# Patient Record
Sex: FEMALE | Race: BLACK OR AFRICAN AMERICAN | NOT HISPANIC OR LATINO | Employment: OTHER | URBAN - METROPOLITAN AREA
[De-identification: names, ages, dates, MRNs, and addresses within clinical notes are randomized per-mention and may not be internally consistent; named-entity substitution may affect disease eponyms.]

---

## 2018-04-28 ENCOUNTER — HOSPITAL ENCOUNTER (EMERGENCY)
Facility: HOSPITAL | Age: 48
Discharge: HOME | End: 2018-04-28
Payer: COMMERCIAL

## 2018-04-28 VITALS
DIASTOLIC BLOOD PRESSURE: 80 MMHG | WEIGHT: 170.19 LBS | OXYGEN SATURATION: 97 % | RESPIRATION RATE: 18 BRPM | HEART RATE: 87 BPM | TEMPERATURE: 98 F | HEIGHT: 63 IN | SYSTOLIC BLOOD PRESSURE: 140 MMHG | BODY MASS INDEX: 30.16 KG/M2

## 2018-04-28 DIAGNOSIS — N92.1 MENORRHAGIA WITH IRREGULAR CYCLE: ICD-10-CM

## 2018-04-28 DIAGNOSIS — R73.9 HYPERGLYCEMIA: Primary | ICD-10-CM

## 2018-04-28 LAB
ALBUMIN SERPL BCP-MCNC: 4.5 G/DL
ALP SERPL-CCNC: 80 U/L
ALT SERPL W/O P-5'-P-CCNC: 20 U/L
ANION GAP SERPL CALC-SCNC: 16 MMOL/L
AST SERPL-CCNC: 19 U/L
B-HCG UR QL: NEGATIVE
BACTERIA #/AREA URNS HPF: NORMAL /HPF
BASOPHILS # BLD AUTO: 0.02 K/UL
BASOPHILS NFR BLD: 0.2 %
BILIRUB SERPL-MCNC: 0.6 MG/DL
BILIRUB UR QL STRIP: NEGATIVE
BUN SERPL-MCNC: 14 MG/DL
CALCIUM SERPL-MCNC: 10.5 MG/DL
CHLORIDE SERPL-SCNC: 98 MMOL/L
CLARITY UR: CLEAR
CO2 SERPL-SCNC: 21 MMOL/L
COLOR UR: YELLOW
CREAT SERPL-MCNC: 1 MG/DL
DIFFERENTIAL METHOD: ABNORMAL
EOSINOPHIL # BLD AUTO: 0.2 K/UL
EOSINOPHIL NFR BLD: 1.8 %
ERYTHROCYTE [DISTWIDTH] IN BLOOD BY AUTOMATED COUNT: 13.1 %
EST. GFR  (AFRICAN AMERICAN): >60 ML/MIN/1.73 M^2
EST. GFR  (NON AFRICAN AMERICAN): >60 ML/MIN/1.73 M^2
GLUCOSE SERPL-MCNC: 319 MG/DL (ref 70–110)
GLUCOSE SERPL-MCNC: 405 MG/DL
GLUCOSE UR QL STRIP: ABNORMAL
HCG INTACT+B SERPL-ACNC: <1.2 MIU/ML
HCT VFR BLD AUTO: 41 %
HGB BLD-MCNC: 14.2 G/DL
HGB UR QL STRIP: ABNORMAL
KETONES UR QL STRIP: ABNORMAL
LEUKOCYTE ESTERASE UR QL STRIP: NEGATIVE
LYMPHOCYTES # BLD AUTO: 3.3 K/UL
LYMPHOCYTES NFR BLD: 38.6 %
MCH RBC QN AUTO: 31.2 PG
MCHC RBC AUTO-ENTMCNC: 34.6 G/DL
MCV RBC AUTO: 90 FL
MICROSCOPIC COMMENT: NORMAL
MONOCYTES # BLD AUTO: 0.5 K/UL
MONOCYTES NFR BLD: 5.8 %
NEUTROPHILS # BLD AUTO: 4.6 K/UL
NEUTROPHILS NFR BLD: 53.6 %
NITRITE UR QL STRIP: NEGATIVE
PH UR STRIP: 5 [PH] (ref 5–8)
PLATELET # BLD AUTO: 275 K/UL
PMV BLD AUTO: 9.5 FL
POCT GLUCOSE: 319 MG/DL (ref 70–110)
POTASSIUM SERPL-SCNC: 4 MMOL/L
PROT SERPL-MCNC: 8.7 G/DL
PROT UR QL STRIP: NEGATIVE
RBC # BLD AUTO: 4.55 M/UL
RBC #/AREA URNS HPF: 1 /HPF (ref 0–4)
SODIUM SERPL-SCNC: 135 MMOL/L
SP GR UR STRIP: 1.01 (ref 1–1.03)
SQUAMOUS #/AREA URNS HPF: 2 /HPF
URN SPEC COLLECT METH UR: ABNORMAL
UROBILINOGEN UR STRIP-ACNC: NEGATIVE EU/DL
WBC # BLD AUTO: 8.65 K/UL
WBC #/AREA URNS HPF: 2 /HPF (ref 0–5)
YEAST URNS QL MICRO: NORMAL

## 2018-04-28 PROCEDURE — 81025 URINE PREGNANCY TEST: CPT

## 2018-04-28 PROCEDURE — 96360 HYDRATION IV INFUSION INIT: CPT

## 2018-04-28 PROCEDURE — 99283 EMERGENCY DEPT VISIT LOW MDM: CPT | Mod: 25

## 2018-04-28 PROCEDURE — 80053 COMPREHEN METABOLIC PANEL: CPT

## 2018-04-28 PROCEDURE — 25000003 PHARM REV CODE 250: Performed by: PHYSICIAN ASSISTANT

## 2018-04-28 PROCEDURE — 85025 COMPLETE CBC W/AUTO DIFF WBC: CPT

## 2018-04-28 PROCEDURE — 84702 CHORIONIC GONADOTROPIN TEST: CPT

## 2018-04-28 PROCEDURE — 81000 URINALYSIS NONAUTO W/SCOPE: CPT

## 2018-04-28 PROCEDURE — 82962 GLUCOSE BLOOD TEST: CPT

## 2018-04-28 RX ORDER — METFORMIN HYDROCHLORIDE 500 MG/1
500 TABLET ORAL 2 TIMES DAILY WITH MEALS
Qty: 60 TABLET | Refills: 1 | Status: SHIPPED | OUTPATIENT
Start: 2018-04-28 | End: 2019-04-28

## 2018-04-28 RX ORDER — SODIUM CHLORIDE 9 MG/ML
1000 INJECTION, SOLUTION INTRAVENOUS
Status: COMPLETED | OUTPATIENT
Start: 2018-04-28 | End: 2018-04-28

## 2018-04-28 RX ORDER — METFORMIN HYDROCHLORIDE 500 MG/1
500 TABLET ORAL ONCE
Status: DISCONTINUED | OUTPATIENT
Start: 2018-04-28 | End: 2018-04-28

## 2018-04-28 RX ORDER — METFORMIN HYDROCHLORIDE 500 MG/1
500 TABLET ORAL ONCE
Status: COMPLETED | OUTPATIENT
Start: 2018-04-28 | End: 2018-04-28

## 2018-04-28 RX ORDER — NAPROXEN 500 MG/1
500 TABLET ORAL 2 TIMES DAILY WITH MEALS
Qty: 20 TABLET | Refills: 0 | Status: SHIPPED | OUTPATIENT
Start: 2018-04-28

## 2018-04-28 RX ADMIN — METFORMIN HYDROCHLORIDE 500 MG: 500 TABLET, FILM COATED ORAL at 09:04

## 2018-04-28 RX ADMIN — SODIUM CHLORIDE 1000 ML: 0.9 INJECTION, SOLUTION INTRAVENOUS at 09:04

## 2018-04-28 NOTE — ED PROVIDER NOTES
SCRIBE #1 NOTE: I, Marylou Kenny, am scribing for, and in the presence of, SELAM Clark. I have scribed the entire note.      History      Chief Complaint   Patient presents with    Pelvic Pain     started today and once last week. denies any other symptoms.       Review of patient's allergies indicates:  No Known Allergies     HPI   HPI    2018, 6:51 PM   History obtained from the patient      History of Present Illness: Angus Mackay is a 47 y.o. female patient who presents to the Emergency Department for pelvic pain which onset gradually a week ago. Pt currently rates the pain a 4/10. Symptoms are intermittent and moderate in severity. Pt states her current episode of pain started a few hours ago. No mitigating or exacerbating factors reported. No associated sxs reported. Patient denies any fever, chills, dysuria, hematuria, urinary frequency/urgency, n/v/d, vaginal bleeding/discharge, abd pain, lightheadedness, dizziness, and all other sxs at this time. No prior tx reported. No further complaints or concerns at this time.         Arrival mode: Personal vehicle     PCP: Primary Doctor No       Past Medical History:  History reviewed. No pertinent past medical history.    Past Surgical History:  Past Surgical History:   Procedure Laterality Date    ABDOMINAL SURGERY       SECTION           Family History:  History reviewed. No pertinent family history.    Social History:  Social History     Social History Main Topics    Smoking status: Unknown If Ever Smoked    Smokeless tobacco: Unknown    Alcohol use No    Drug use: No    Sexual activity: Unknown       ROS   Review of Systems   Constitutional: Negative for chills and fever.   HENT: Negative for sore throat.    Respiratory: Negative for shortness of breath.    Cardiovascular: Negative for chest pain.   Gastrointestinal: Negative for abdominal pain, diarrhea, nausea and vomiting.   Genitourinary: Positive for pelvic pain.  "Negative for dysuria, frequency, hematuria, urgency, vaginal bleeding and vaginal discharge.   Musculoskeletal: Negative for back pain.   Skin: Negative for rash.   Neurological: Negative for dizziness, weakness and light-headedness.   Hematological: Does not bruise/bleed easily.   All other systems reviewed and are negative.      Physical Exam      Initial Vitals [04/28/18 1848]   BP Pulse Resp Temp SpO2   (!) 153/82 105 16 98.3 °F (36.8 °C) 96 %      MAP       105.67          Physical Exam  Nursing Notes and Vital Signs Reviewed.  Constitutional: Patient is in no acute distress. Well-developed and well-nourished.  Head: Atraumatic. Normocephalic.  Eyes: PERRL. EOM intact. Conjunctivae are not pale. No scleral icterus.  ENT: Mucous membranes are moist. Oropharynx is clear and symmetric.    Neck: Supple. Full ROM. No lymphadenopathy.  Cardiovascular: Regular rate. Regular rhythm. No murmurs, rubs, or gallops. Distal pulses are 2+ and symmetric.  Pulmonary/Chest: No respiratory distress. Clear to auscultation bilaterally. No wheezing or rales.  Abdominal: Soft and non-distended.  There is no tenderness.  No rebound, guarding, or rigidity.   Musculoskeletal: Moves all extremities. No obvious deformities. No edema.   Skin: Warm and dry.  Neurological:  Alert, awake, and appropriate.  Normal speech.  No acute focal neurological deficits are appreciated.  Psychiatric: Normal affect. Good eye contact. Appropriate in content.    ED Course    Procedures  ED Vital Signs:  Vitals:    04/28/18 1848 04/28/18 2041 04/28/18 2155   BP: (!) 153/82 (!) 140/85 (!) 140/80   Pulse: 105 90 87   Resp: 16 18 18   Temp: 98.3 °F (36.8 °C)     TempSrc: Oral     SpO2: 96% 96% 97%   Weight: 77.2 kg (170 lb 3.1 oz)     Height: 5' 3" (1.6 m)         Abnormal Lab Results:  Labs Reviewed   URINALYSIS - Abnormal; Notable for the following:        Result Value    Glucose, UA 3+ (*)     Ketones, UA 1+ (*)     Occult Blood UA Trace (*)     All other " components within normal limits   CBC W/ AUTO DIFFERENTIAL - Abnormal; Notable for the following:     MCH 31.2 (*)     All other components within normal limits   COMPREHENSIVE METABOLIC PANEL - Abnormal; Notable for the following:     Sodium 135 (*)     CO2 21 (*)     Glucose 405 (*)     Total Protein 8.7 (*)     All other components within normal limits   POCT GLUCOSE MONITORING CONTINUOUS - Abnormal; Notable for the following:     POC Glucose 319 (*)     All other components within normal limits   POCT GLUCOSE - Abnormal; Notable for the following:     POCT Glucose 319 (*)     All other components within normal limits   PREGNANCY TEST, URINE RAPID   URINALYSIS MICROSCOPIC   HCG, QUANTITATIVE, PREGNANCY        All Lab Results:  Results for orders placed or performed during the hospital encounter of 04/28/18   Urinalysis Clean Catch   Result Value Ref Range    Specimen UA Urine, Clean Catch     Color, UA Yellow Yellow, Straw, Eileen    Appearance, UA Clear Clear    pH, UA 5.0 5.0 - 8.0    Specific Gravity, UA 1.010 1.005 - 1.030    Protein, UA Negative Negative    Glucose, UA 3+ (A) Negative    Ketones, UA 1+ (A) Negative    Bilirubin (UA) Negative Negative    Occult Blood UA Trace (A) Negative    Nitrite, UA Negative Negative    Urobilinogen, UA Negative <2.0 EU/dL    Leukocytes, UA Negative Negative   Pregnancy, urine rapid (UPT)   Result Value Ref Range    Preg Test, Ur Negative    Urinalysis Microscopic   Result Value Ref Range    RBC, UA 1 0 - 4 /hpf    WBC, UA 2 0 - 5 /hpf    Bacteria, UA Rare None-Occ /hpf    Yeast, UA None None    Squam Epithel, UA 2 /hpf    Microscopic Comment SEE COMMENT    CBC auto differential   Result Value Ref Range    WBC 8.65 3.90 - 12.70 K/uL    RBC 4.55 4.00 - 5.40 M/uL    Hemoglobin 14.2 12.0 - 16.0 g/dL    Hematocrit 41.0 37.0 - 48.5 %    MCV 90 82 - 98 fL    MCH 31.2 (H) 27.0 - 31.0 pg    MCHC 34.6 32.0 - 36.0 g/dL    RDW 13.1 11.5 - 14.5 %    Platelets 275 150 - 350 K/uL    MPV  9.5 9.2 - 12.9 fL    Gran # (ANC) 4.6 1.8 - 7.7 K/uL    Lymph # 3.3 1.0 - 4.8 K/uL    Mono # 0.5 0.3 - 1.0 K/uL    Eos # 0.2 0.0 - 0.5 K/uL    Baso # 0.02 0.00 - 0.20 K/uL    Gran% 53.6 38.0 - 73.0 %    Lymph% 38.6 18.0 - 48.0 %    Mono% 5.8 4.0 - 15.0 %    Eosinophil% 1.8 0.0 - 8.0 %    Basophil% 0.2 0.0 - 1.9 %    Differential Method Automated    Comprehensive metabolic panel   Result Value Ref Range    Sodium 135 (L) 136 - 145 mmol/L    Potassium 4.0 3.5 - 5.1 mmol/L    Chloride 98 95 - 110 mmol/L    CO2 21 (L) 23 - 29 mmol/L    Glucose 405 (H) 70 - 110 mg/dL    BUN, Bld 14 6 - 20 mg/dL    Creatinine 1.0 0.5 - 1.4 mg/dL    Calcium 10.5 8.7 - 10.5 mg/dL    Total Protein 8.7 (H) 6.0 - 8.4 g/dL    Albumin 4.5 3.5 - 5.2 g/dL    Total Bilirubin 0.6 0.1 - 1.0 mg/dL    Alkaline Phosphatase 80 55 - 135 U/L    AST 19 10 - 40 U/L    ALT 20 10 - 44 U/L    Anion Gap 16 8 - 16 mmol/L    eGFR if African American >60 >60 mL/min/1.73 m^2    eGFR if non African American >60 >60 mL/min/1.73 m^2   hCG, quantitative, pregnancy   Result Value Ref Range    hCG Quant <1.2 See Text mIU/mL   POCT glucose   Result Value Ref Range    POC Glucose 319 (A) 70 - 110 mg/dL   POCT glucose   Result Value Ref Range    POCT Glucose 319 (H) 70 - 110 mg/dL                The Emergency Provider reviewed the vital signs and test results, which are outlined above.    ED Discussion     9:26 PM: Reassessed pt at this time.  Pt is awake, alert, and in NAD at this time. Discussed with pt all pertinent ED information and results. Discussed pt dx and plan of tx. Gave pt all f/u and return to the ED instructions. All questions and concerns were addressed at this time. Pt expresses understanding of information and instructions, and is comfortable with plan to discharge. Pt is stable for discharge.    I discussed with patient and/or family/caretaker that evaluation in the ED does not suggest any emergent or life threatening medical conditions requiring immediate  intervention beyond what was provided in the ED, and I believe patient is safe for discharge.  Regardless, an unremarkable evaluation in the ED does not preclude the development or presence of a serious of life threatening condition. As such, patient was instructed to return immediately for any worsening or change in current symptoms.    Pt was re-evaluated and repeat glucose 319.  States she feels much better and is ready to go home    ED Medication(s):  Medications   0.9%  NaCl infusion (0 mLs Intravenous Stopped 4/28/18 2147)   metFORMIN tablet 500 mg (500 mg Oral Given 4/28/18 2116)       Discharge Medication List as of 4/28/2018  9:26 PM      START taking these medications    Details   metFORMIN (GLUCOPHAGE) 500 MG tablet Take 1 tablet (500 mg total) by mouth 2 (two) times daily with meals., Starting Sat 4/28/2018, Until Sun 4/28/2019, Print      naproxen (NAPROSYN) 500 MG tablet Take 1 tablet (500 mg total) by mouth 2 (two) times daily with meals., Starting Sat 4/28/2018, Print                   Medical Decision Making    Medical Decision Making:   Clinical Tests:   Lab Tests: Ordered and Reviewed           Scribe Attestation:   Scribe #1: I performed the above scribed service and the documentation accurately describes the services I performed. I attest to the accuracy of the note.    Attending:   Physician Attestation Statement for Scribe #1: I, SELAM Clark, personally performed the services described in this documentation, as scribed by Marylou Kenny, in my presence, and it is both accurate and complete.          Clinical Impression       ICD-10-CM ICD-9-CM   1. Hyperglycemia R73.9 790.29   2. Menorrhagia with irregular cycle N92.1 626.2       Disposition:   Disposition: Discharged  Condition: Stable         SELAM Yi  04/28/18 1080

## 2018-10-15 ENCOUNTER — TELEPHONE (OUTPATIENT)
Dept: OPHTHALMOLOGY | Facility: CLINIC | Age: 48
End: 2018-10-15

## 2018-10-15 NOTE — TELEPHONE ENCOUNTER
----- Message from Sapna Leeite sent at 10/15/2018  3:37 PM CDT -----  Contact: Pt  Pt called and stated she needs to cancel her appt because she is sick and wants to schedule for another day. She can be reached at [phone].    Thanks,  TF

## 2018-10-15 NOTE — TELEPHONE ENCOUNTER
Pt request to cancel appointment due to not feeling well. She will call back when she feels better and when she knows her schedule.     Kerri